# Patient Record
Sex: MALE
[De-identification: names, ages, dates, MRNs, and addresses within clinical notes are randomized per-mention and may not be internally consistent; named-entity substitution may affect disease eponyms.]

---

## 2020-09-14 ENCOUNTER — TELEPHONE (OUTPATIENT)
Dept: SCHEDULING | Age: 65
End: 2020-09-14

## 2020-09-14 ENCOUNTER — OFFICE VISIT (OUTPATIENT)
Dept: URGENT CARE | Age: 65
End: 2020-09-14

## 2020-09-14 VITALS — TEMPERATURE: 97.2 F

## 2020-09-14 DIAGNOSIS — Z23 NEED FOR PROPHYLACTIC VACCINATION AND INOCULATION AGAINST INFLUENZA: Primary | ICD-10-CM

## 2020-09-14 PROCEDURE — 90662 IIV NO PRSV INCREASED AG IM: CPT | Performed by: NURSE PRACTITIONER

## 2020-09-14 PROCEDURE — 90471 IMMUNIZATION ADMIN: CPT | Performed by: NURSE PRACTITIONER

## 2021-03-15 DIAGNOSIS — Z23 NEED FOR VACCINATION: ICD-10-CM

## 2023-05-04 ENCOUNTER — OFFICE VISIT (OUTPATIENT)
Dept: NEUROLOGY | Facility: CLINIC | Age: 68
End: 2023-05-04
Payer: MEDICARE

## 2023-05-04 ENCOUNTER — LAB ENCOUNTER (OUTPATIENT)
Dept: LAB | Facility: HOSPITAL | Age: 68
End: 2023-05-04
Attending: Other
Payer: MEDICARE

## 2023-05-04 VITALS
HEIGHT: 70 IN | DIASTOLIC BLOOD PRESSURE: 80 MMHG | WEIGHT: 158 LBS | SYSTOLIC BLOOD PRESSURE: 128 MMHG | BODY MASS INDEX: 22.62 KG/M2

## 2023-05-04 DIAGNOSIS — G93.0 ARACHNOID CYST OF POSTERIOR CRANIAL FOSSA: ICD-10-CM

## 2023-05-04 DIAGNOSIS — R25.1 TREMOR: ICD-10-CM

## 2023-05-04 DIAGNOSIS — R25.1 TREMOR: Primary | ICD-10-CM

## 2023-05-04 PROCEDURE — 82390 ASSAY OF CERULOPLASMIN: CPT

## 2023-05-04 PROCEDURE — 99204 OFFICE O/P NEW MOD 45 MIN: CPT | Performed by: OTHER

## 2023-05-04 PROCEDURE — 36415 COLL VENOUS BLD VENIPUNCTURE: CPT

## 2023-05-04 RX ORDER — METRONIDAZOLE 7.5 MG/G
1 LOTION TOPICAL 2 TIMES DAILY
COMMUNITY
Start: 2021-06-29

## 2023-05-04 RX ORDER — CYCLOBENZAPRINE HCL 5 MG
5 TABLET ORAL AS NEEDED
COMMUNITY
Start: 2023-02-17

## 2023-05-05 ENCOUNTER — TELEPHONE (OUTPATIENT)
Dept: NEUROLOGY | Facility: CLINIC | Age: 68
End: 2023-05-05

## 2023-05-05 DIAGNOSIS — R25.1 TREMOR: Primary | ICD-10-CM

## 2023-05-05 LAB — CERULOPLASMIN SERPL-MCNC: 15.5 MG/DL (ref 20–60)

## 2023-05-05 NOTE — TELEPHONE ENCOUNTER
Patient called office asking if he should stop drinking red wine for a month as it contains different minerals such as zinc.     Advised to avoid zinc supplementation. Pt verbalized understanding & appreciative of call.

## 2023-05-05 NOTE — TELEPHONE ENCOUNTER
Called & spoke to patient. Notified of message regarding ceruloplasmin as noted below. He takes a multivitamin that he thinks may contain zinc. He will stop it & repeat lab in 1 month.

## 2023-05-05 NOTE — TELEPHONE ENCOUNTER
----- Message from Ara Kearns MD sent at 5/5/2023  7:46 AM CDT -----  I have tried to call the patient, however there was no answer. Please let the patient know that results of ceruloplasmin level was slightly low. I still do not think he has Ab's disease. But can you clarify if he is taking supplements, seems very commonly overdoing with zinc supplements could be a factor. If he is taking supplements lets try to stop them and have it rechecked in 1 month.     Thank you

## 2023-06-05 ENCOUNTER — LAB ENCOUNTER (OUTPATIENT)
Dept: LAB | Facility: HOSPITAL | Age: 68
End: 2023-06-05
Attending: Other
Payer: MEDICARE

## 2023-06-05 DIAGNOSIS — R25.1 TREMOR: ICD-10-CM

## 2023-06-05 LAB — CERULOPLASMIN SERPL-MCNC: 19 MG/DL (ref 20–60)

## 2023-06-05 PROCEDURE — 82390 ASSAY OF CERULOPLASMIN: CPT

## 2023-06-05 PROCEDURE — 36415 COLL VENOUS BLD VENIPUNCTURE: CPT

## 2023-06-06 ENCOUNTER — TELEPHONE (OUTPATIENT)
Dept: NEUROLOGY | Facility: CLINIC | Age: 68
End: 2023-06-06

## 2023-06-06 NOTE — TELEPHONE ENCOUNTER
----- Message from Mauricio Rausch MD sent at 6/6/2023  9:00 AM CDT -----  Please let the patient know that results of these particular lab tests have improved. I will still suggest to check it out in 6 months when he follows up.     Thank you

## 2023-06-07 NOTE — TELEPHONE ENCOUNTER
That is fine he can take those vitamins if he wants. Generally there is no necessity to take extra vitamins if there are no deficiencies.

## 2023-10-14 ENCOUNTER — V-VISIT (OUTPATIENT)
Dept: URGENT CARE | Age: 68
End: 2023-10-14

## 2023-10-14 VITALS
BODY MASS INDEX: 22.9 KG/M2 | RESPIRATION RATE: 18 BRPM | DIASTOLIC BLOOD PRESSURE: 88 MMHG | SYSTOLIC BLOOD PRESSURE: 136 MMHG | WEIGHT: 160 LBS | OXYGEN SATURATION: 97 % | HEART RATE: 83 BPM | HEIGHT: 70 IN | TEMPERATURE: 98.2 F

## 2023-10-14 DIAGNOSIS — J01.00 ACUTE NON-RECURRENT MAXILLARY SINUSITIS: Primary | ICD-10-CM

## 2023-10-14 PROCEDURE — 99203 OFFICE O/P NEW LOW 30 MIN: CPT | Performed by: NURSE PRACTITIONER

## 2023-10-14 RX ORDER — AMOXICILLIN AND CLAVULANATE POTASSIUM 875; 125 MG/1; MG/1
1 TABLET, FILM COATED ORAL 2 TIMES DAILY
Qty: 20 TABLET | Refills: 0 | Status: SHIPPED | OUTPATIENT
Start: 2023-10-14 | End: 2023-10-24

## 2023-10-14 ASSESSMENT — ENCOUNTER SYMPTOMS
FEVER: 0
SINUS PAIN: 1
NAUSEA: 0
SINUS PRESSURE: 1
RESPIRATORY NEGATIVE: 1
HEADACHES: 0
CHILLS: 0
ABDOMINAL PAIN: 0
VOICE CHANGE: 0
SORE THROAT: 0
FATIGUE: 0
CONSTITUTIONAL NEGATIVE: 1
GASTROINTESTINAL NEGATIVE: 1
NEUROLOGICAL NEGATIVE: 1
COUGH: 0
RHINORRHEA: 0
VOMITING: 0
ALLERGIC/IMMUNOLOGIC NEGATIVE: 1